# Patient Record
Sex: MALE | Race: BLACK OR AFRICAN AMERICAN | URBAN - METROPOLITAN AREA
[De-identification: names, ages, dates, MRNs, and addresses within clinical notes are randomized per-mention and may not be internally consistent; named-entity substitution may affect disease eponyms.]

---

## 2017-01-18 ENCOUNTER — HOSPITAL ENCOUNTER (OUTPATIENT)
Dept: LAB | Age: 42
Discharge: HOME | End: 2017-01-18

## 2017-01-18 ENCOUNTER — OFFICE VISIT (OUTPATIENT)
Dept: FAMILY MEDICINE CLINIC | Age: 42
End: 2017-01-18

## 2017-01-18 VITALS
TEMPERATURE: 98.1 F | HEIGHT: 72 IN | WEIGHT: 299 LBS | BODY MASS INDEX: 40.5 KG/M2 | SYSTOLIC BLOOD PRESSURE: 156 MMHG | DIASTOLIC BLOOD PRESSURE: 106 MMHG | HEART RATE: 95 BPM

## 2017-01-18 DIAGNOSIS — E66.01 MORBID OBESITY, UNSPECIFIED OBESITY TYPE (HCC): ICD-10-CM

## 2017-01-18 DIAGNOSIS — I10 ESSENTIAL HYPERTENSION: ICD-10-CM

## 2017-01-18 DIAGNOSIS — Z71.3 WEIGHT LOSS COUNSELING, ENCOUNTER FOR: ICD-10-CM

## 2017-01-18 DIAGNOSIS — I10 ESSENTIAL HYPERTENSION: Primary | ICD-10-CM

## 2017-01-18 DIAGNOSIS — R05.9 COUGH: ICD-10-CM

## 2017-01-18 LAB
ALBUMIN SERPL BCP-MCNC: 4.2 G/DL (ref 3.5–5)
ALBUMIN/GLOB SERPL: 1.2 {RATIO} (ref 1.1–2.2)
ALP SERPL-CCNC: 76 U/L (ref 45–117)
ALT SERPL-CCNC: 54 U/L (ref 12–78)
ANION GAP BLD CALC-SCNC: 11 MMOL/L (ref 5–15)
AST SERPL W P-5'-P-CCNC: 43 U/L (ref 15–37)
BILIRUB SERPL-MCNC: 1 MG/DL (ref 0.2–1)
BUN SERPL-MCNC: 11 MG/DL (ref 6–20)
BUN/CREAT SERPL: 10 (ref 12–20)
CALCIUM SERPL-MCNC: 9.2 MG/DL (ref 8.5–10.1)
CHLORIDE SERPL-SCNC: 101 MMOL/L (ref 97–108)
CO2 SERPL-SCNC: 25 MMOL/L (ref 21–32)
CREAT SERPL-MCNC: 1.15 MG/DL (ref 0.7–1.3)
GLOBULIN SER CALC-MCNC: 3.4 G/DL (ref 2–4)
GLUCOSE SERPL-MCNC: 177 MG/DL (ref 65–100)
POTASSIUM SERPL-SCNC: 4.1 MMOL/L (ref 3.5–5.1)
PROT SERPL-MCNC: 7.6 G/DL (ref 6.4–8.2)
SODIUM SERPL-SCNC: 137 MMOL/L (ref 136–145)
TSH SERPL DL<=0.05 MIU/L-ACNC: 1.08 UIU/ML (ref 0.36–3.74)

## 2017-01-18 PROCEDURE — 80053 COMPREHEN METABOLIC PANEL: CPT | Performed by: NURSE PRACTITIONER

## 2017-01-18 PROCEDURE — 84443 ASSAY THYROID STIM HORMONE: CPT | Performed by: NURSE PRACTITIONER

## 2017-01-18 RX ORDER — LISINOPRIL AND HYDROCHLOROTHIAZIDE 12.5; 2 MG/1; MG/1
TABLET ORAL
Qty: 90 TAB | Refills: 0 | Status: SHIPPED | OUTPATIENT
Start: 2017-01-18 | End: 2017-04-19 | Stop reason: SDUPTHER

## 2017-01-18 RX ORDER — LORATADINE 10 MG/1
TABLET ORAL
Qty: 90 TAB | Refills: 3 | Status: SHIPPED | OUTPATIENT
Start: 2017-01-18

## 2017-01-18 NOTE — PROGRESS NOTES
Assessment/Plan:       ICD-10-CM ICD-9-CM    1. Essential hypertension I10 401.9 lisinopril-hydroCHLOROthiazide (PRINZIDE, ZESTORETIC) 20-12.5 mg per tablet      TSH 3RD GENERATION      METABOLIC PANEL, COMPREHENSIVE   2. Cough R05 786.2 loratadine (CLARITIN) 10 mg tablet   3. Weight loss counseling, encounter for Z71.3 V65.3    4. Morbid obesity, unspecified obesity type ([facility] E66.01 278.01      Follow-up Disposition:  Return in about 2 months (around 3/18/2017) for hypertension; take outside measurements, change diet, exercise lose 15-30 lbs. Subjective:   Nolberto Antonio is a 37 y.o. male who presents for follow up of   Chief Complaint   Patient presents with    Hypertension     f/u     Current Outpatient Prescriptions   Medication Sig Dispense Refill    lisinopril-hydroCHLOROthiazide (PRINZIDE, ZESTORETIC) 20-12.5 mg per tablet TAKE ONE TABLET BY MOUTH EVERY DAY 90 Tab 0    loratadine (CLARITIN) 10 mg tablet TAKE ONE TABLET BY MOUTH DAILY FOR ALLERGIES 90 Tab 3    albuterol (PROVENTIL HFA, VENTOLIN HFA, PROAIR HFA) 90 mcg/actuation inhaler Take 2 Puffs by inhalation every six (6) hours as needed for Wheezing or Shortness of Breath. 1 Inhaler 3     He also  has a past medical history of HTN (hypertension) and Morbid obesity ([facility] .  Measuring blood pressures outside of clinic: No  Last took medication: this morning; admits to forgetting to take it. Physical activity: no (he stopped after his dad passed away - enjoys playing basketball and using the treadmill at the gym. Trying to eat low sodium diet: sometimes  Family History: family history includes Heart Attack in his father; Kidney Disease in his father. Social History:  reports that he has quit smoking. He does not have any smokeless tobacco history on file. He reports that he does not drink alcohol or use illicit drugs. Mom takes 1 pill. Quit smoking 6 months ago. Took the blood pressure pill this morning.   Cardiovascular ROS: no chest pain on exertion, no dyspnea on exertion, no swelling of ankles. Objective:     Vitals:    01/18/17 0838 01/18/17 0841 01/18/17 1238   BP: (!) 138/99 (!) 145/99 (!) 156/106   Pulse: 98 95    Temp: 98.1 °F (36.7 °C)     TempSrc: Oral     Weight: 299 lb (135.6 kg)     Height: 6' 0.25\" (1.835 m)       Wt Readings from Last 2 Encounters:   01/18/17 299 lb (135.6 kg)   04/15/15 307 lb (139.3 kg)     No visits with results within 1 Month(s) from this visit. Latest known visit with results is:    Hospital Outpatient Visit on 03/21/2012   Component Date Value Ref Range Status    Sodium 03/21/2012 138  136 - 145 MMOL/L Final    Potassium 03/21/2012 4.3  3.5 - 5.1 MMOL/L Final    Chloride 03/21/2012 101  97 - 108 MMOL/L Final    CO2 03/21/2012 29  21 - 32 MMOL/L Final    Anion gap 03/21/2012 8  5 - 15 mmol/L Final    Glucose 03/21/2012 100  65 - 100 MG/DL Final    BUN 03/21/2012 10  6 - 20 MG/DL Final    Creatinine 03/21/2012 1.1  0.6 - 1.3 MG/DL Final    BUN/Creatinine ratio 03/21/2012 9* 12 - 20   Final    GFR est AA 03/21/2012 >60  >60 ml/min/1.73m2 Final    GFR est non-AA 03/21/2012 >60  >60 ml/min/1.73m2 Final    Comment: (NOTE)                           Estimated GFR is calculated using the Modification of Diet in Renal                            Disease (MDRD) Study equation, reported for both  Americans                            (GFRAA) and non- Americans (GFRNA), and normalized to 1.73m2                            body surface area. The physician must decide which value applies to                            the patient. The MDRD study equation should only be used in                            individuals age 25 or older.  It has not been validated for the                            following: pregnant women, patients with serious comorbid conditions,                            or on certain medications, or persons with extremes of body size,                            muscle mass, or nutritional status.  Calcium 03/21/2012 8.9  8.5 - 10.1 MG/DL Final    Bilirubin, total 03/21/2012 1.1* 0.2 - 1.0 MG/DL Final    ALT 03/21/2012 66  12 - 78 U/L Final    AST 03/21/2012 34  15 - 37 U/L Final    Alk. phosphatase 03/21/2012 66  50 - 136 U/L Final    Protein, total 03/21/2012 7.6  6.4 - 8.2 g/dL Final    Albumin 03/21/2012 4.3  3.5 - 5.0 g/dL Final    Globulin 03/21/2012 3.3  2.0 - 4.0 g/dL Final    A-G Ratio 03/21/2012 1.3  1.1 - 2.2   Final    LIPID PROFILE 03/21/2012       Final    Cholesterol, total 03/21/2012 137  <200 MG/DL Final    Triglyceride 03/21/2012 119  <150 MG/DL Final    Comment: Based on NCEP-ATP III:  Triglycerides <150 mg/dL  is considered normal,                            150-199 mg/dL  borderline high,  200-499 mg/dL high and  greater than or equal                            to 500 mg/dL very high.  HDL Cholesterol 03/21/2012 32  MG/DL Final    Comment: Based on NCEP ATP III, HDL Cholesterol <40 mg/dL is considered low and >60                            mg/dL is elevated.  LDL, calculated 03/21/2012 81.2  0 - 100 MG/DL Final    Comment: Based on the NCEP-ATP: LDL-C concentrations are considered  optimal <100                            mg/dL,                           near optimal/above Normal 100-129 mg/dL                           Borderline High: 130-159, High: 160-189 mg/dL                           Very High: Greater than or equal to 190 mg/dL    VLDL, calculated 03/21/2012 23.8  MG/DL Final    CHOL/HDL Ratio 03/21/2012 4.3  0 - 5.0   Final     Labs discussed with patient. Appearance:Eyes with mild exophthalmos. No thyromegaly. oriented to person, place, and time. General exam: CVS exam BP noted to be severely elevated today in office, S1, S2 normal, no gallop, no murmur, chest clear, no JVD, no HSM, no edema, fundi - normal.  Assessment/Plan:   Hypertension in poor control. He is a , 2 daughters, 5 and 13, gets them ready in the morning.   Lives with his girlfriend who has 3 kids. Has toast most every morning and feeds them breakfast.  Never leaves home without his ballcap hat. Discussed putting his meds next to his hat. He has lost down to 250 before but then his dad . Discussed he has done it before, he can do it again, today is a new day. Measure outside bps. Declined nutrition appointment. \"I don't need it. \"  I answered his questions re: Low sodium - decrease/stop using croutons (he was not aware croutons had sodium). Calories - drink water, stop orange juice. He likes the treadmill and playing basketball at the gym. Would like to be off all medication if possible, if he had to have increase in meds, he'd want still one pill (which we could do through [facility]). Estranged from his dad - didn't acknowledge that his dad had kidney disease. Gael Jones was seen today for hypertension. Diagnoses and all orders for this visit:    Essential hypertension  -     lisinopril-hydroCHLOROthiazide (PRINZIDE, ZESTORETIC) 20-12.5 mg per tablet; TAKE ONE TABLET BY MOUTH EVERY DAY  -     TSH 3RD GENERATION; Future  -     METABOLIC PANEL, COMPREHENSIVE; Future    Cough  -     loratadine (CLARITIN) 10 mg tablet; TAKE ONE TABLET BY MOUTH DAILY FOR ALLERGIES    Weight loss counseling, encounter for    Morbid obesity, unspecified obesity type ([facility]      Follow-up Disposition:  Return in about 2 months (around 3/18/2017) for hypertension; take outside measurements, change diet, exercise lose 15-30 lbs. I anticipate he may need augmentation of medication. He did not want to do this today, agrees to labs today and close follow up. Reviewed signs/sx/reasons to go to ER.

## 2017-01-18 NOTE — PROGRESS NOTES
Coordination of Care  1. Have you been to the ER, urgent care clinic since your last visit? Hospitalized since your last visit? No    2. Have you seen or consulted any other health care providers outside of the [address] [place] [address] since your last visit? Include any pap smears or colon screening. No    Medications  Medication Reconciliation Performed: no  Patient does need refills     Learning Assessment Complete?  yes

## 2017-01-20 ENCOUNTER — TELEPHONE (OUTPATIENT)
Dept: FAMILY MEDICINE CLINIC | Age: 42
End: 2017-01-20

## 2017-03-14 DIAGNOSIS — R73.09 ELEVATED GLUCOSE: Primary | ICD-10-CM

## 2017-03-14 DIAGNOSIS — I10 ESSENTIAL HYPERTENSION: ICD-10-CM

## 2017-04-19 ENCOUNTER — HOSPITAL ENCOUNTER (OUTPATIENT)
Dept: LAB | Age: 42
Discharge: HOME | End: 2017-04-19

## 2017-04-19 ENCOUNTER — OFFICE VISIT (OUTPATIENT)
Dept: FAMILY MEDICINE CLINIC | Age: 42
End: 2017-04-19

## 2017-04-19 VITALS
BODY MASS INDEX: 39.33 KG/M2 | WEIGHT: 292 LBS | SYSTOLIC BLOOD PRESSURE: 133 MMHG | TEMPERATURE: 97.6 F | DIASTOLIC BLOOD PRESSURE: 91 MMHG | HEART RATE: 81 BPM

## 2017-04-19 DIAGNOSIS — R73.09 ELEVATED GLUCOSE: ICD-10-CM

## 2017-04-19 DIAGNOSIS — I10 ESSENTIAL HYPERTENSION: Primary | ICD-10-CM

## 2017-04-19 LAB
EST. AVERAGE GLUCOSE BLD GHB EST-MCNC: 189 MG/DL
HBA1C MFR BLD: 8.2 % (ref 4.2–6.3)

## 2017-04-19 PROCEDURE — 83036 HEMOGLOBIN GLYCOSYLATED A1C: CPT | Performed by: NURSE PRACTITIONER

## 2017-04-19 RX ORDER — LISINOPRIL AND HYDROCHLOROTHIAZIDE 12.5; 2 MG/1; MG/1
TABLET ORAL
Qty: 90 TAB | Refills: 0 | Status: SHIPPED | OUTPATIENT
Start: 2017-04-19 | End: 2017-06-27 | Stop reason: SDUPTHER

## 2017-04-19 NOTE — PROGRESS NOTES
Coordination of Care  1. Have you been to the ER, urgent care clinic since your last visit? Hospitalized since your last visit? No    2. Have you seen or consulted any other health care providers outside of the [address] [place] [address] since your last visit? Include any pap smears or colon screening. No    Medications  Medication Reconciliation Performed: no  Patient does need refills     Learning Assessment Complete?  yes

## 2017-04-19 NOTE — PROGRESS NOTES
Assessment/Plan:       ICD-10-CM ICD-9-CM    1. Essential hypertension I10 401.9 lisinopril-hydroCHLOROthiazide (PRINZIDE, ZESTORETIC) 20-12.5 mg per tablet     Follow-up Disposition: Not on File    [facility]  Subjective:   Agustín Pace is a 37 y.o. BLACK OR  male who speaks [place]. Chief Complaint   Patient presents with    Hypertension     f/u    History of Present Illness: has been exercising and eating differently. Discussed bp better, not at goal.   Review of Systems: Negative for: fever, chest pain, shortness of breath, leg swelling, exertional dyspnea, palpitations. Past Surgical History: He  has no past surgical history on file. Social History: He  reports that he has quit smoking. He does not have any smokeless tobacco history on file. He reports that he does not drink alcohol or use illicit drugs. Objective:     Vitals:    04/19/17 0947   BP: (!) 133/91   Pulse: 81   Temp: 97.6 °F (36.4 °C)   TempSrc: Oral   Weight: 292 lb (132.5 kg)    No LMP for male patient. Wt Readings from Last 2 Encounters:   04/19/17 292 lb (132.5 kg)   01/18/17 299 lb (135.6 kg)   Has been exercising. Lab Review:  Physical Examination:  General appearance - well developed, no acute distress. Chest - clear to auscultation. Heart - regular rate and rhythm without murmurs, rubs, or gallops. Abdomen - bowel sounds present x 4, NT, ND. Extremities - pulses intact. No peripheral edema. Assessment/Plan:   Nafisa Singh was seen today for hypertension. Diagnoses and all orders for this visit:    Essential hypertension  -     lisinopril-hydroCHLOROthiazide (PRINZIDE, ZESTORETIC) 20-12.5 mg per tablet; TAKE ONE TABLET BY MOUTH EVERY DAY  -we are ruling out diabetes. He's been eating a lot of kale, one turkey sandwich instead of more, no juice at all, not smoking. . This weight loss may be healthy rather than related to poor glucose control.   Prefers no medicines but agreeable to keeping himself healthy  I rec nutritionist - will reinforce if glucose up  Follow-up Disposition: Not on File  Magali Wilson, MSN, RN, FNP-BC, BC-ADM  Sofía Rios expressed understanding of this plan.

## 2017-04-24 NOTE — PROGRESS NOTES
Diabetes means A1C of 6.5 or more. His is 8.2 - he has diabetes. Continue exercise and plans to see nutritionist.  I am recommending he also start metformin 500mg, 1 po bid after eating.   Begin with one po after dinner for a week, then add one after the morning meal and continue one after the evening meal.

## 2017-04-24 NOTE — PROGRESS NOTES
Result note relayed to patient. Patient expressed dissapointment and surprise, stating he had lost some weight \"I don't eat bread and I don't eat pasta so I don't know why the numbers are like that, can I get a second opinion? \" Explained to patient he is more than welcome to get a second opinion, however the A1c lab results are what the doctors are looking for. Pt not interested on starting Metformin and really wants to for the provider to give him a personal call please so he can discuss the results with her. He said, \"if I have to take it then I guess I'll take it\" but prefers to speak to John Shepherd DNP first please.

## 2017-04-25 ENCOUNTER — TELEPHONE (OUTPATIENT)
Dept: FAMILY MEDICINE CLINIC | Age: 42
End: 2017-04-25

## 2017-04-25 NOTE — PROGRESS NOTES
Returned pt's call from late yesterday, 4/24/17. He said that he had called back hoping to talk to THERESE Balderrama. He asked more questions regarding his A1C result. RN explained that the A1C test examines his blood sugar over that last 3 months. He asked if there was a \"lesser\" medication than Metformin to take. He also asked if his blood sugar would go down if he continues to change his diet and exercise. RN explained that  Arielle Montero orders the medication that she thinks is appropriate for her patients, and also this is a low cost medication. RN encouraged pt to continue to improve his diet and exercise as this helps to lower blood sugar. He stated that he still wants to talk with Arielle Montero directly. RN advised him that the message will be sent to Arielle Montero, and that she is working in the clinic the next few days, so it may be a while before she can return the call.

## 2017-04-25 NOTE — TELEPHONE ENCOUNTER
Please see note under labs regarding calls pertaining to pt's lab results. RN returned call to pt and the note is under labs.   Bhumika Tracey RN

## 2017-04-25 NOTE — TELEPHONE ENCOUNTER
Patient left message late Monday that he was returning our call of a few minutes ago and asked that we please call him back.     Christine Taylor

## 2017-04-30 ENCOUNTER — TELEPHONE (OUTPATIENT)
Dept: FAMILY MEDICINE CLINIC | Age: 42
End: 2017-04-30

## 2017-05-01 NOTE — PROGRESS NOTES
I will be in the office at [facility] making patient phone calls this Wednesday, May 3.

## 2017-05-16 NOTE — TELEPHONE ENCOUNTER
Telephone call to patient. He believes he is losing weight due to diet and he does not have diabetes. Discussed that A1C of 8.2 indicates an average glucose close to 200, where a normal glucose is less than 100, and this causes damage. I had recommended he begin medication because his blood glucose was double the level of normal.  He would like another chance to have it checked. He will try to make the line tomorrow and he will present fasting. I plan to do a fasting fingerstick glucose. If he doesn't make the line he may see the nurse. If his fasting glucose is 126 or greater, this would confirm a diagnosis of diabetes. I also mentioned that just because we may start medication doesn't mean he must take it forever. I am strongly advising, however, that he take it right now, while he is in the process of making lifestyle changes, due to an average blood glucose close to 200. He had no further questions at close of call.

## 2017-05-17 ENCOUNTER — HOSPITAL ENCOUNTER (OUTPATIENT)
Dept: LAB | Age: 42
Discharge: HOME | End: 2017-05-17

## 2017-05-17 ENCOUNTER — OFFICE VISIT (OUTPATIENT)
Dept: FAMILY MEDICINE CLINIC | Age: 42
End: 2017-05-17

## 2017-05-17 VITALS
TEMPERATURE: 97.7 F | WEIGHT: 278 LBS | SYSTOLIC BLOOD PRESSURE: 134 MMHG | HEART RATE: 72 BPM | DIASTOLIC BLOOD PRESSURE: 92 MMHG | BODY MASS INDEX: 37.44 KG/M2

## 2017-05-17 DIAGNOSIS — Z13.1 SCREENING FOR DIABETES MELLITUS (DM): ICD-10-CM

## 2017-05-17 DIAGNOSIS — R73.01 ELEVATED FASTING GLUCOSE: ICD-10-CM

## 2017-05-17 DIAGNOSIS — I10 ESSENTIAL HYPERTENSION: Primary | ICD-10-CM

## 2017-05-17 LAB
CHOLEST SERPL-MCNC: 142 MG/DL
EST. AVERAGE GLUCOSE BLD GHB EST-MCNC: 163 MG/DL
GLUCOSE POC: NORMAL MG/DL
HBA1C MFR BLD: 7.3 % (ref 4.2–6.3)
HDLC SERPL-MCNC: 42 MG/DL
HDLC SERPL: 3.4 {RATIO} (ref 0–5)
LDLC SERPL CALC-MCNC: 80.8 MG/DL (ref 0–100)
LIPID PROFILE,FLP: NORMAL
TRIGL SERPL-MCNC: 96 MG/DL (ref ?–150)
VLDLC SERPL CALC-MCNC: 19.2 MG/DL

## 2017-05-17 PROCEDURE — 80061 LIPID PANEL: CPT | Performed by: NURSE PRACTITIONER

## 2017-05-17 PROCEDURE — 83036 HEMOGLOBIN GLYCOSYLATED A1C: CPT | Performed by: NURSE PRACTITIONER

## 2017-05-17 NOTE — PROGRESS NOTES
Coordination of Care  1. Have you been to the ER, urgent care clinic since your last visit? Hospitalized since your last visit? No    2. Have you seen or consulted any other health care providers outside of the [address] [place] [address] since your last visit? Include any pap smears or colon screening. No    Medications  Medication Reconciliation Performed: no  Patient does need refills     Learning Assessment Complete?  yes

## 2017-05-17 NOTE — PROGRESS NOTES
Assessment/Plan:       ICD-10-CM ICD-9-CM    1. Essential hypertension I10 401.9 LIPID PANEL   2. Elevated fasting glucose R73.01 790.21 AMB POC GLUCOSE BLOOD, BY GLUCOSE MONITORING DEVICE      HEMOGLOBIN A1C WITH EAG     Follow-up Disposition:  Return in about 6 weeks (around 6/27/2017). [facility]  Subjective:   Amberly Perez is a 37 y.o. BLACK OR  male who speaks [place]. Chief Complaint   Patient presents with    Hypertension    History of Present Illness:    Review of Systems: Negative for: fever, chest pain, shortness of breath, leg swelling, exertional dyspnea, palpitations. Past Surgical History: He  has no past surgical history on file. Social History: He  reports that he has quit smoking. He does not have any smokeless tobacco history on file. He reports that he does not drink alcohol or use illicit drugs. Objective:     Vitals:    05/17/17 0857   BP: (!) 134/92   Pulse: 72   Temp: 97.7 °F (36.5 °C)   TempSrc: Oral   Weight: 278 lb (126.1 kg)    No LMP for male patient. Wt Readings from Last 2 Encounters:   05/17/17 278 lb (126.1 kg)   04/19/17 292 lb (132.5 kg)     Lab Review:  Results for orders placed or performed in visit on 05/17/17   AMB POC GLUCOSE BLOOD, BY GLUCOSE MONITORING DEVICE   Result Value Ref Range    Glucose  Fasting mg/dL      Physical Examination:   General appearance - well developed, no acute distress. Chest - clear to auscultation. Heart - regular rate and rhythm without murmurs, rubs, or gallops. Abdomen - bowel sounds present x 4, NT, ND. Extremities - pulses intact. No peripheral edema. Assessment/Plan:   Sánchez Warren was seen today for hypertension. Diagnoses and all orders for this visit:    Essential hypertension  -     LIPID PANEL; Future    Elevated fasting glucose  -     AMB POC GLUCOSE BLOOD, BY GLUCOSE MONITORING DEVICE  -     HEMOGLOBIN A1C WITH EAG; Future    Elevated bp.   He is doing 3 miles a day on his treadmill and eating salads, \"no bread, no sugary drinks. \"  Discussed that this is lifelong and will be slowly learning to incorporate veyr small amounts of carbs back into his diet. His appt is in June with dietician. I provided [address] picturing mac and cheese as the carb. He said he'd never eat that - it makes him want more - asks me about quinoa, which his mom mentioned, and butternut squash - does it have too many carbs to be a vegetable? I circled the bread/carb and the fruit categories on the back of the plate handout and told him I do not know exact portion sizes, but the nutritionist will help with that. He has done research \"on the web\" and still thinks he shouldn't eat any carbs, but his mom, girlfriend, and now me are telling him he needs to eat them. I asked him to continue to gather his questions about specific foods for when he sees the nutritionist, continue to exercise daily. Will do an interim a1c and will do lipid panel since he is fasting, no changes to the plan and will see him in June. Fasting glucose 123 today, just under the threshold for official diagnosis of diabetes. We talked about heart disease as the reason why blood sugar, blood pressure, and cholesterol are important to pay attention to. Diabetes doubles the risk, and it goes up from there. He appreciates the seriousness of this and is doing his part (his words). Follow-up Disposition:  Return in about 6 weeks (around 6/27/2017). Veronica De Guzman, MSN, RN, FNP-BC, BC-ADM  Darlene Stoll expressed understanding of this plan.

## 2017-05-18 NOTE — PROGRESS NOTES
Wonderful progress! We discussed that you can't eliminate carbohydrates from your diet long-term. Keep up the hard work with diet/exercise as you learn how to incorporate eating small portions of carbohydrates throughout the day with your meals.

## 2017-05-25 PROBLEM — R73.01 ELEVATED FASTING GLUCOSE: Status: ACTIVE | Noted: 2017-05-25

## 2017-06-27 ENCOUNTER — OFFICE VISIT (OUTPATIENT)
Dept: FAMILY MEDICINE CLINIC | Age: 42
End: 2017-06-27

## 2017-06-27 VITALS
HEART RATE: 91 BPM | OXYGEN SATURATION: 98 % | WEIGHT: 262 LBS | DIASTOLIC BLOOD PRESSURE: 90 MMHG | TEMPERATURE: 98.5 F | HEIGHT: 71 IN | BODY MASS INDEX: 36.68 KG/M2 | SYSTOLIC BLOOD PRESSURE: 138 MMHG

## 2017-06-27 DIAGNOSIS — I10 ESSENTIAL HYPERTENSION: ICD-10-CM

## 2017-06-27 DIAGNOSIS — E11.9 DIABETES MELLITUS TYPE 2, DIET-CONTROLLED (HCC): Primary | ICD-10-CM

## 2017-06-27 DIAGNOSIS — Z71.3 DIETARY COUNSELING AND SURVEILLANCE: Primary | ICD-10-CM

## 2017-06-27 RX ORDER — LISINOPRIL AND HYDROCHLOROTHIAZIDE 12.5; 2 MG/1; MG/1
TABLET ORAL
Qty: 90 TAB | Refills: 0 | Status: SHIPPED | OUTPATIENT
Start: 2017-06-27

## 2017-06-27 NOTE — PROGRESS NOTES
Assessment/Plan:       ICD-10-CM ICD-9-CM    1. Diabetes mellitus type 2, diet-controlled (HCC) E11.9 250.00 HEMOGLOBIN A1C WITH EAG   2. Essential hypertension I10 401.9 lisinopril-hydroCHLOROthiazide (PRINZIDE, ZESTORETIC) 20-12.5 mg per tablet    Greater than 50% of this 25 minute visit was spent in face-to-face counseling/coordination of care regarding diabetes management. Follow-up Disposition:  Return in about 3 months (around 9/27/2017) for hypertension; labs 2 weeks before. [facility]  Subjective:   Dayanara Adam is a 40 y.o. BLACK OR  male who speaks [place]. 120s/80s, 130/82. [place] when he pulled his shoulder - all labs normal and EKG was normal.  Chief Complaint   Patient presents with    Hypertension     f/u   Brought in medications? no Last took them: today; Measuring glucoses? no    Last ate: today. Activity/exercise: yes Work/employment: no  ROS:   no polyuria or polydipsia, no chest pain, dyspnea or TIA's, no numbness, tingling or pain in extremities. Social History: He reports that he has quit smoking. He does not have any smokeless tobacco history on file. He reports that he does not drink alcohol or use illicit drugs. Family History: His family history includes Heart Attack in his father; Kidney Disease in his father. Outpatient Medications Prior to Visit   Medication Sig Dispense Refill    lisinopril-hydroCHLOROthiazide (PRINZIDE, ZESTORETIC) 20-12.5 mg per tablet TAKE ONE TABLET BY MOUTH EVERY DAY 90 Tab 0    loratadine (CLARITIN) 10 mg tablet TAKE ONE TABLET BY MOUTH DAILY FOR ALLERGIES 90 Tab 3     No facility-administered medications prior to visit. Surgical History: No past surgical history on file.   Objective:     Vitals:    06/27/17 1301 06/27/17 1303 06/27/17 1342   BP: (!) 123/93 129/85 138/90   Pulse: 95 91    Temp: 98.5 °F (36.9 °C)     TempSrc: Oral     SpO2: 98%     Weight: 262 lb (118.8 kg)     Height: 5' 11.1\" (1.806 m) No LMP for male patient. No results found for any visits on 06/27/17. Wt Readings from Last 2 Encounters:   06/27/17 262 lb (118.8 kg)   05/17/17 278 lb (126.1 kg)    Weight decreased; Hemoglobin A1c   Date Value Ref Range Status   05/17/2017 7.3 (H) 4.2 - 6.3 % Final   04/19/2017 8.2 (H) 4.2 - 6.3 % Final    A1C decreased;   Lab Results   Component Value Date/Time    Creatinine 1.15 01/18/2017 09:20 AM    Talked to nutritionist.  Lab Results   Component Value Date/Time    GFR est AA >60 01/18/2017 09:20 AM    GFR est non-AA >60 01/18/2017 09:20 AM       Lab Results   Component Value Date/Time    Cholesterol, total 142 05/17/2017 10:32 AM    HDL Cholesterol 42 05/17/2017 10:32 AM    LDL, calculated 80.8 05/17/2017 10:32 AM    Triglyceride 96 05/17/2017 10:32 AM    CHOL/HDL Ratio 3.4 05/17/2017 10:32 AM      Lab Results   Component Value Date/Time    ALT (SGPT) 54 01/18/2017 09:20 AM    AST (SGOT) 43 01/18/2017 09:20 AM    Alk. phosphatase 76 01/18/2017 09:20 AM    Bilirubin, total 1.0 01/18/2017 09:20 AM     Constitutional: He appears well-developed. Eyes: EOM are normal. Pupils are equal, round, and reactive to light. Neck: Neck supple. No thyromegaly present. Cardiovascular: Normal rate, regular rhythm, normal heart sounds and intact distal pulses. No murmur heard. Pulmonary/Chest: Effort normal and breath sounds normal.   Musculoskeletal: He exhibits no edema. No ulcers of the lower extremities. Assessment/Plan:   Inderjit Damico was seen today for hypertension. Diagnoses and all orders for this visit:    Diabetes mellitus type 2, diet-controlled ([address]  -     HEMOGLOBIN A1C WITH EAG; Future    Essential hypertension  -     lisinopril-hydroCHLOROthiazide (PRINZIDE, ZESTORETIC) 20-12.5 mg per tablet; TAKE ONE TABLET BY MOUTH EVERY DAY    Diabetes Mellitus type 2, under Good  control. Blood pressure under Fair control.     Greater than 50% of this 25 minute visit was spent in face-to-face counseling/coordination of care regarding diabetes management. Follow-up Disposition:  Return in about 3 months (around 9/27/2017) for hypertension; labs 2 weeks before. Bailee Morales, MSN, RN, FNP-BC, BC-ADM  Jenifer Patel expressed understanding of this plan.

## 2017-06-27 NOTE — PROGRESS NOTES
Coordination of Care  1. Have you been to the ER, urgent care clinic since your last visit? Hospitalized since your last visit? Yes When: 2 weeks ago [facility] Reason for visit: back pain    2. Have you seen or consulted any other health care providers outside of the [address] [place] [address] since your last visit? Include any pap smears or colon screening. No    Medications  Medication Reconciliation Performed: no  Patient does need refills     Learning Assessment Complete?  yes

## 2025-02-28 NOTE — TELEPHONE ENCOUNTER
I reached patient. Discussed his breakfast that morning: piece of toast and an orange. Discussed his glucose was close to 200, which is nearing a diagnosis of diabetes. He stated he has already switched to drinking water only, eating salads with protein and cheese without croutons, and is setting up his membership at the gym. He had many questions like if he should give up all fruit and bread, or if he should eat less often. We discussed that the dietician has specialized knowledge and tips in answering these questions, and the answers may not be what he would think - that skipping meals is bad for weight loss, and eating no bread or fruit is also unhealthy. He agreed to have an appointment set up with a dietician. I will ask a  to call him next week.
abdominal pain

## 2025-07-14 NOTE — PROGRESS NOTES
Mr. Grace Peralta was referred to [facility] for T2DM nutrition education. Current weight 262 lbs (RD scale) reflecting a 16 lb wt loss from last appt in 5/17  BMI 36.4 classifying pt as obesity class 2  Mr. Grace Peralta has made many changes to lifestyle that include: exercising 3 miles every day and has eliminated grains altogether  \"I feel better than I have in a long time! \"  Rd reviewed pt current dietary pattern. Discussed the role each macronutrient plays on our bodies. Introduced the MyPlate and explained how it helps to ensure balance and portion. Lean PRO and veggies are important pieces of all meals. RD encouraged pt continue with his current exercise program and to continue limiting intake of starchy CHO. Gave handout on whole grains. Pt will also begin taking a Mens MV. 15